# Patient Record
Sex: FEMALE | Race: WHITE | Employment: OTHER | URBAN - METROPOLITAN AREA
[De-identification: names, ages, dates, MRNs, and addresses within clinical notes are randomized per-mention and may not be internally consistent; named-entity substitution may affect disease eponyms.]

---

## 2024-05-15 RX ORDER — MONTELUKAST SODIUM 10 MG/1
10 TABLET ORAL NIGHTLY
COMMUNITY

## 2024-05-15 RX ORDER — FLUTICASONE PROPIONATE AND SALMETEROL 100; 50 UG/1; UG/1
1 POWDER RESPIRATORY (INHALATION) EVERY 12 HOURS
COMMUNITY

## 2024-05-15 RX ORDER — CETIRIZINE HYDROCHLORIDE 10 MG/1
10 TABLET ORAL DAILY
COMMUNITY

## 2024-05-15 RX ORDER — ESTRADIOL 2 MG/1
2 TABLET ORAL DAILY
COMMUNITY

## 2024-05-15 RX ORDER — FLUOXETINE HYDROCHLORIDE 40 MG/1
40 CAPSULE ORAL DAILY
COMMUNITY

## 2024-05-15 RX ORDER — OXYBUTYNIN CHLORIDE 10 MG/1
10 TABLET, EXTENDED RELEASE ORAL DAILY
COMMUNITY

## 2024-05-15 RX ORDER — SERTRALINE HYDROCHLORIDE 100 MG/1
100 TABLET, FILM COATED ORAL DAILY
COMMUNITY

## 2024-05-20 ENCOUNTER — OFFICE VISIT (OUTPATIENT)
Age: 64
End: 2024-05-20
Payer: COMMERCIAL

## 2024-05-20 VITALS
HEIGHT: 66 IN | WEIGHT: 237 LBS | HEART RATE: 82 BPM | DIASTOLIC BLOOD PRESSURE: 72 MMHG | BODY MASS INDEX: 38.09 KG/M2 | OXYGEN SATURATION: 98 % | TEMPERATURE: 94.3 F | SYSTOLIC BLOOD PRESSURE: 118 MMHG

## 2024-05-20 DIAGNOSIS — Z01.419 ENCOUNTER FOR ANNUAL ROUTINE GYNECOLOGICAL EXAMINATION: Primary | ICD-10-CM

## 2024-05-20 DIAGNOSIS — Z12.39 BREAST SCREENING: ICD-10-CM

## 2024-05-20 DIAGNOSIS — Z78.0 MENOPAUSE: ICD-10-CM

## 2024-05-20 DIAGNOSIS — N39.46 MIXED INCONTINENCE: ICD-10-CM

## 2024-05-20 LAB
BILIRUBIN, POC: NORMAL
BLOOD URINE, POC: NORMAL
CLARITY, POC: CLEAR
COLOR, POC: YELLOW
GLUCOSE URINE, POC: NORMAL
KETONES, POC: 15
LEUKOCYTE EST, POC: NORMAL
NITRITE, POC: NORMAL
PH, POC: 7
PROTEIN, POC: NORMAL
SPECIFIC GRAVITY, POC: 1.01
UROBILINOGEN, POC: NORMAL

## 2024-05-20 PROCEDURE — 81002 URINALYSIS NONAUTO W/O SCOPE: CPT | Performed by: NURSE PRACTITIONER

## 2024-05-20 PROCEDURE — 99396 PREV VISIT EST AGE 40-64: CPT | Performed by: NURSE PRACTITIONER

## 2024-05-20 RX ORDER — METHYLPHENIDATE HYDROCHLORIDE 54 MG/1
54 TABLET, EXTENDED RELEASE ORAL EVERY MORNING
COMMUNITY

## 2024-05-20 ASSESSMENT — ENCOUNTER SYMPTOMS
GASTROINTESTINAL NEGATIVE: 1
RESPIRATORY NEGATIVE: 1

## 2024-05-20 NOTE — PROGRESS NOTES
[facility], [facility] UROGYNECOLOGY AND PELVIC REHABILITATION   [facility] [address]  Dept: [phone]  Date: 5/20/2024  Patient Name: Barbara Thompson  VISIT - ANNUAL WELL EXAM  CC: This is an annual well patient visit.     HPI:  Pt presents for annual gyn exam. Reports she is feeling well. Hot flashes/night sweats improved with estradiol, no SE. Pt reports urgency/frequency improved with [id number] ER, still unable to stop flow of urine if it starts. No SE. Bms daily, rarely strains.  Reports PCP does breast exams. Pt declines pelvic exam/pap smear.    Overall state of well-being reviewed. Fall & depression assessments confirmed. Dietary & nutritional habits reviewed & fist-size portions of lean protein, fruits & vegetables, & carbs with each meal recommended. Low glycemic indexed foods recommended for snacks. Water intake discussed with caffeine & alcohol moderation discussed. Weight bearing exercise routines of at least 30 minutes 3 times a week discussed for healthy weight maintenance.  Weight reduction through exercise, a healthy diet, & evidenced based programs like Weight Watchers & NOOM discussed as necessary. Breast symptoms, abnormal abdominal bloating, bowel and bladder function, smoking history, HRT history, sexual activity & partner/s, dyspareunia, and immunizations reviewed.    Topics of Prolapse, Pain, Pressure reviewed including type, period of onset, level of severity, quality and quantity, associations, trends, exacerbators, alleviators, bleeding, prolapse to reduce, splinting, and prior treatment / surgery.    Topics of Urinary Leakage reviewed including type, period of onset, level of severity, quality and quantity, associations, trends, exacerbators, alleviators, urgency, frequency, nocturia ,urge incontinence / stress incontinence triggers, hesitancy, obstruction with need to catheterize, frequent UTI\"s

## 2024-08-05 RX ORDER — ESTRADIOL 2 MG/1
2 TABLET ORAL DAILY
Qty: 90 TABLET | Refills: 2 | Status: SHIPPED | OUTPATIENT
Start: 2024-08-05

## 2024-08-05 RX ORDER — OXYBUTYNIN CHLORIDE 15 MG/1
15 TABLET, EXTENDED RELEASE ORAL DAILY
Qty: 90 TABLET | Refills: 2 | Status: SHIPPED | OUTPATIENT
Start: 2024-08-05

## 2024-08-05 NOTE — TELEPHONE ENCOUNTER
Barbara Thompson is calling to request a refill on the following medication(s):    Last Visit Date (If Applicable):  5/20/2024    Next Visit Date:    Visit date not found    Medication Request:  Requested Prescriptions     Pending Prescriptions Disp Refills    estradiol (ESTRACE) 2 MG tablet [Pharmacy Med Name: ESTRADIOL 2 MG TABLET] 90 tablet      Sig: TAKE 1 TABLET BY MOUTH EVERY DAY    oxyBUTYnin (DITROPAN XL) 15 MG extended release tablet [Pharmacy Med Name: OXYBUTYNIN CL ER 15 MG TABLET] 90 tablet      Sig: TAKE 1 TABLET BY MOUTH EVERY DAY

## 2025-04-11 ENCOUNTER — TELEPHONE (OUTPATIENT)
Age: 65
End: 2025-04-11

## 2025-04-14 DIAGNOSIS — Z13.820 SCREENING FOR OSTEOPOROSIS: ICD-10-CM

## 2025-04-14 PROCEDURE — 77080 DXA BONE DENSITY AXIAL: CPT | Performed by: NURSE PRACTITIONER

## 2025-04-21 ENCOUNTER — TELEPHONE (OUTPATIENT)
Age: 65
End: 2025-04-21

## 2025-04-24 RX ORDER — OXYBUTYNIN CHLORIDE 15 MG/1
15 TABLET, EXTENDED RELEASE ORAL DAILY
Qty: 90 TABLET | Refills: 0 | Status: SHIPPED | OUTPATIENT
Start: 2025-04-24 | End: 2025-06-24

## 2025-04-24 RX ORDER — ESTRADIOL 2 MG/1
2 TABLET ORAL DAILY
Qty: 90 TABLET | Refills: 0 | Status: SHIPPED | OUTPATIENT
Start: 2025-04-24 | End: 2025-06-24

## 2025-05-25 NOTE — PROGRESS NOTES
[facility] UROGYNECOLOGY AND PELVIC REHABILITATION   [address] [place] [address]  Dept: [phone]   Patient:  Barbara Thompson   :  1960   Visit Date:  2025     VISIT - Follow up visit, medication check  CC: This is a med check    HPI:    History of Present Illness  The patient presents for a medication check.    She reports that her current medication, oxybutynin 15 mg, is not providing the desired relief. She was advised at her last visit on 2024 to continue the medication for a longer duration. She experiences urinary urgency and frequency, with limited mobility before needing to use the restroom. Incontinence occurs if she is unable to reach the bathroom in time. She also reports stress incontinence during coughing and sneezing. She has not undergone any bladder testing with Dr. Alves. Initially, oxybutynin was effective, but its efficacy has since diminished. She has expressed interest in trying Myrbetriq and is open to pelvic floor therapy if covered by her insurance. She has attempted Kegel exercises for symptom management.    She is currently on estradiol 2 mg tablets. She has a history of nonalcoholic steatohepatitis (OSORIO), diagnosed by her internist following abnormal liver enzyme readings. She does not have a gastroenterologist or hepatologist overseeing her liver condition. Her mother had OSORIO, which caused significant health issues in her later years. Both of her sisters also have OSORIO. She has undergone recent liver function tests and is awaiting the results. She has previously used a topical estrogen aerosol.    She has hypercholesterolemia and is on medication for it. She has a family history of cholesterol problems, but both her parents had severe reactions to statins.    She has undergone a hysterectomy due to endometriosis and adhesions, which required an open surgical approach after

## 2025-05-27 ENCOUNTER — OFFICE VISIT (OUTPATIENT)
Age: 65
End: 2025-05-27
Payer: COMMERCIAL

## 2025-05-27 VITALS
HEART RATE: 79 BPM | DIASTOLIC BLOOD PRESSURE: 68 MMHG | BODY MASS INDEX: 37.96 KG/M2 | SYSTOLIC BLOOD PRESSURE: 119 MMHG | WEIGHT: 235.2 LBS

## 2025-05-27 DIAGNOSIS — Z01.419 WELL WOMAN EXAM WITH ROUTINE GYNECOLOGICAL EXAM: Primary | ICD-10-CM

## 2025-05-27 DIAGNOSIS — N32.81 OAB (OVERACTIVE BLADDER): ICD-10-CM

## 2025-05-27 DIAGNOSIS — K75.81 NONALCOHOLIC STEATOHEPATITIS (NASH): ICD-10-CM

## 2025-05-27 DIAGNOSIS — N39.46 URINARY INCONTINENCE, MIXED: ICD-10-CM

## 2025-05-27 DIAGNOSIS — Z12.31 ENCOUNTER FOR SCREENING MAMMOGRAM FOR MALIGNANT NEOPLASM OF BREAST: ICD-10-CM

## 2025-05-27 DIAGNOSIS — Z78.0 MENOPAUSE: ICD-10-CM

## 2025-05-27 PROCEDURE — 99214 OFFICE O/P EST MOD 30 MIN: CPT

## 2025-05-27 RX ORDER — ESTRADIOL 0.1 MG/D
1 PATCH TRANSDERMAL WEEKLY
Qty: 4 PATCH | Refills: 3 | Status: SHIPPED | OUTPATIENT
Start: 2025-05-27

## 2025-05-27 RX ORDER — EZETIMIBE 10 MG/1
10 TABLET ORAL DAILY
COMMUNITY
Start: 2025-03-14

## 2025-05-27 RX ORDER — METHYLPHENIDATE HYDROCHLORIDE 36 MG/1
36 TABLET ORAL DAILY
COMMUNITY
Start: 2025-05-06

## 2025-05-27 RX ORDER — MIRABEGRON 50 MG/1
50 TABLET, FILM COATED, EXTENDED RELEASE ORAL DAILY
Qty: 30 TABLET | Refills: 1 | Status: SHIPPED | OUTPATIENT
Start: 2025-05-27

## 2025-05-27 RX ORDER — FLUTICASONE PROPIONATE 50 MCG
SPRAY, SUSPENSION (ML) NASAL
COMMUNITY

## 2025-05-27 ASSESSMENT — ENCOUNTER SYMPTOMS: GASTROINTESTINAL NEGATIVE: 1

## 2025-05-27 NOTE — PATIENT INSTRUCTIONS
[facility]    URGE SUPPRESSION EXERCISES    ? Do you rush to the bathroom for fear of losing urine?    ? Do you dribble urine on your way to the bathroom?        The feeling of urgency to get to the bathroom most likely is caused by bladder spasms.  Rushing to the bathroom during spasms or urge causes your bladder to bounce.  This causes more bladder spasms.  Learning urge suppression may help you control and / or eliminate these spasms so that you can stop the feeling of urgency and walk to the bathroom calmly.    To stop the feeling of urgency to urinate:    1. Remain in the same position in which you began having the feeling to urinate.  If You are sitting, remain   seated.  If you are walking, stop walking but remain standing.    2. Tighten your rectum, then let go a little, tighten again and let go a little.  Keep doing this until the urge   feeling has passed (about 15 - 30 seconds).By tightening and letting go of your rectum, you stimulate a   nerve in your Pelvic muscles which causes the bladder to relax.  This stops the strong feeling to urinate.    3. Tell yourself that you are in control of your bladder - not the other way around.    4. Once the urge is over, take a deep breath and relax.  Then walk to the bathroom calmly.              [facility] UROGYNECOLOGY & PELVIC REHABILITATION    Strengthening Exercises for Pelvic Floor Muscles      Introduction  Pregnancy, childbirth, obesity and excessive straining from frequent constipation can weaken a woman's pelvic floor muscles.    Weak pelvic floor muscles can cause urinary incontinence or loss of urine when pressure is exerted on the bladder (for example, by coughing, laughing, running, jumping or lifting).   This is called stress incontinence.  Aging and decreased levels of estrogen during and following menopause also can contribute to urinary incontinence.    Many women with urinary incontinence can

## 2025-06-18 DIAGNOSIS — N32.81 OAB (OVERACTIVE BLADDER): ICD-10-CM

## 2025-06-19 RX ORDER — MIRABEGRON 50 MG/1
50 TABLET, FILM COATED, EXTENDED RELEASE ORAL DAILY
Qty: 90 TABLET | Refills: 1 | Status: SHIPPED | OUTPATIENT
Start: 2025-06-19

## 2025-06-24 ENCOUNTER — OFFICE VISIT (OUTPATIENT)
Age: 65
End: 2025-06-24
Payer: COMMERCIAL

## 2025-06-24 VITALS
OXYGEN SATURATION: 98 % | DIASTOLIC BLOOD PRESSURE: 68 MMHG | SYSTOLIC BLOOD PRESSURE: 114 MMHG | HEART RATE: 86 BPM | TEMPERATURE: 97.7 F

## 2025-06-24 DIAGNOSIS — N95.1 MENOPAUSAL SYMPTOMS: ICD-10-CM

## 2025-06-24 DIAGNOSIS — R35.0 URINARY FREQUENCY: Primary | ICD-10-CM

## 2025-06-24 DIAGNOSIS — R39.15 URGENCY OF URINATION: ICD-10-CM

## 2025-06-24 LAB
BILIRUBIN, POC: NORMAL
BLOOD URINE, POC: NORMAL
CLARITY, POC: CLEAR
COLOR, POC: YELLOW
GLUCOSE URINE, POC: NORMAL MG/DL
KETONES, POC: NORMAL MG/DL
LEUKOCYTE EST, POC: NORMAL
NITRITE, POC: NORMAL
PH, POC: 7
PROTEIN, POC: NORMAL MG/DL
SPECIFIC GRAVITY, POC: 1.02
UROBILINOGEN, POC: NORMAL MG/DL

## 2025-06-24 PROCEDURE — 99214 OFFICE O/P EST MOD 30 MIN: CPT

## 2025-06-24 PROCEDURE — 81003 URINALYSIS AUTO W/O SCOPE: CPT

## 2025-06-24 PROCEDURE — 51798 US URINE CAPACITY MEASURE: CPT

## 2025-06-24 RX ORDER — TROSPIUM CHLORIDE ER 60 MG/1
60 CAPSULE ORAL DAILY
Qty: 30 CAPSULE | Refills: 0 | Status: SHIPPED | OUTPATIENT
Start: 2025-06-24 | End: 2025-06-24

## 2025-06-24 RX ORDER — TROSPIUM CHLORIDE ER 60 MG/1
60 CAPSULE ORAL DAILY
Qty: 90 CAPSULE | Refills: 0 | Status: SHIPPED | OUTPATIENT
Start: 2025-06-24

## 2025-06-24 RX ORDER — ESTRADIOL 0.1 MG/D
1 FILM, EXTENDED RELEASE TRANSDERMAL
Qty: 8 PATCH | Refills: 3 | Status: SHIPPED | OUTPATIENT
Start: 2025-06-26

## 2025-06-24 NOTE — PROGRESS NOTES
[facility] UROGYNECOLOGY AND PELVIC REHABILITATION   [address] [place] [address]  Dept: [phone]  Date: 6/24/2025  Patient Name: Barbara Thompson    VISIT - FOLLOW UP VISIT     CC: had concerns including Follow-up (Med check estradiol patch(causing hives/rash), myrbetriq, fills bladder, increased urgency, urinary incontinence  ).      HPI:    History of Present Illness  The patient presents for a follow-up on medication changes.    She was previously prescribed Myrbetriq for overactive bladder, which has resulted in a decrease in frequency but an increase in urgency. She reports that her bladder now fills completely, leading to occasional incontinence due to the inability to reach the bathroom in time. She is scheduled to start pelvic floor therapy in 09/2025. She has not been practicing urge suppression exercises at home. She feels that she is able to empty her bladder well. She is considering changing her medication again,  because she is bothered by the sudden urinary urgency and urge incontinence. She has a history of chronic bladder infections, for which she was treated with Macrodantin or Macrobid for over a year, with the dosage gradually decreasing. She was advised to always wipe from front to back and to lean forward and apply pressure after urination to ensure complete bladder emptying. She no longer has recurrent UTIs.    She is currently using a once-weekly estradiol patch, which she finds effective for symptom management. However, she has developed small rashes at the application sites, which tend to peel off due to excessive sweating. She is considering switching to a twice-weekly application. She has undergone a radical hysterectomy.    She has not yet followed up with her liver disease provider regarding her possible OSORIO diagnosis. She plans to consult her internist for further testing.    GYNECOLOGICAL

## 2025-06-24 NOTE — PATIENT INSTRUCTIONS
[facility]    DIET & DAILY HABITS  Can this affect your bladder or bowel control?    There is no “diet” to cure urinary or fecal problems.  However, there are certain dietary matters you should be concerned about.    Many people who have bladder control problems reduce the amount of liquids they drink in hope they will urinate less often.  Although less fluid intake results in less urine production, the smaller amount is more concentrated and thus, is more irritating to the bladder surface.  Highly concentrated urine (dark yellow, strong-smelling) urine may cause you to go to the bathroom more frequently.  It also encourages the growth of bacteria, which may lead to a urinary tract infection.  Do not restrict fluids to control incontinence without advice from your physician.  Always follow your health care provider's instructions.    Some foods cause urine to smell bad or peculiar.  The most notable food is asparagus.  Another cause of foul-smelling urine, and the most dangerous cause, is urinary tract infection.  If you notice that your urine has a strong odor and you have not eaten any foods that would cause this, you should see a health care provider and have a specimen of your urine tested for infection.    Some medicines may cause your urine to be discolored or have an unusual odor.  Some are medicines that you take for bladder inflammation or for urine tests.  If your urine has a peculiar color or odor, consult the pharmacist who filled your prescription.    Some foods and beverages are though to contribute to bladder leakage.  Their effect of the bladder is not always understood, but you may want to see if eliminating one or all of the items listed improves your urine control.      Common irritants include: alcohol, carbonated beverages, caffeine, milk, coffee (even decaffeinated), tea, medicines with caffeine, citrus juice and fruits, tomatoes, tomato-based

## 2025-08-26 ENCOUNTER — TELEPHONE (OUTPATIENT)
Age: 65
End: 2025-08-26

## 2025-08-26 ENCOUNTER — OFFICE VISIT (OUTPATIENT)
Age: 65
End: 2025-08-26
Payer: COMMERCIAL

## 2025-08-26 VITALS
TEMPERATURE: 99.1 F | DIASTOLIC BLOOD PRESSURE: 76 MMHG | HEART RATE: 81 BPM | OXYGEN SATURATION: 94 % | SYSTOLIC BLOOD PRESSURE: 133 MMHG

## 2025-08-26 DIAGNOSIS — Z78.0 MENOPAUSE: ICD-10-CM

## 2025-08-26 DIAGNOSIS — N39.46 MIXED INCONTINENCE: ICD-10-CM

## 2025-08-26 DIAGNOSIS — N39.46 URINARY INCONTINENCE, MIXED: ICD-10-CM

## 2025-08-26 DIAGNOSIS — N95.1 MENOPAUSAL SYMPTOMS: ICD-10-CM

## 2025-08-26 DIAGNOSIS — R39.15 URGENCY OF URINATION: ICD-10-CM

## 2025-08-26 DIAGNOSIS — K75.81 NONALCOHOLIC STEATOHEPATITIS (NASH): ICD-10-CM

## 2025-08-26 DIAGNOSIS — R35.0 URINARY FREQUENCY: Primary | ICD-10-CM

## 2025-08-26 DIAGNOSIS — N32.81 OAB (OVERACTIVE BLADDER): ICD-10-CM

## 2025-08-26 PROCEDURE — 99214 OFFICE O/P EST MOD 30 MIN: CPT | Performed by: OBSTETRICS & GYNECOLOGY

## 2025-08-26 RX ORDER — ESTRADIOL 0.1 MG/D
1 FILM, EXTENDED RELEASE TRANSDERMAL
Qty: 24 PATCH | Refills: 3 | Status: SHIPPED | OUTPATIENT
Start: 2025-08-28